# Patient Record
Sex: FEMALE | Race: BLACK OR AFRICAN AMERICAN | NOT HISPANIC OR LATINO | URBAN - NONMETROPOLITAN AREA
[De-identification: names, ages, dates, MRNs, and addresses within clinical notes are randomized per-mention and may not be internally consistent; named-entity substitution may affect disease eponyms.]

---

## 2019-07-09 ENCOUNTER — IMPORTED ENCOUNTER (OUTPATIENT)
Dept: URBAN - NONMETROPOLITAN AREA CLINIC 1 | Facility: CLINIC | Age: 24
End: 2019-07-09

## 2019-07-09 PROCEDURE — V2520 CONTACT LENS HYDROPHILIC: HCPCS

## 2019-07-09 PROCEDURE — 92014 COMPRE OPH EXAM EST PT 1/>: CPT

## 2019-07-09 PROCEDURE — 92015 DETERMINE REFRACTIVE STATE: CPT

## 2019-07-09 PROCEDURE — 92310 CONTACT LENS FITTING OU: CPT

## 2019-07-09 NOTE — PATIENT DISCUSSION
*Myopia & Astigmatism:. -  Discussed refractive status with patient in detail. *Gls RX:. -  A new spectacle prescription was created and dispensed today. *Cl fit:.-  Contact lenses fit well appropriately moving and re-centering with each blink. *Contact lens habits:. -  Healthy contact lens wearing habits were discussed including nightly removal and cleaning of contacts and adherence to the recommended replacement schedule for disposable lenses. -  Symptoms of infection were reviewed and instructions were given to discontinue contact lens use and call for an urgent appointment in the event of an eye infection.

## 2020-07-10 ENCOUNTER — IMPORTED ENCOUNTER (OUTPATIENT)
Dept: URBAN - NONMETROPOLITAN AREA CLINIC 1 | Facility: CLINIC | Age: 25
End: 2020-07-10

## 2020-07-10 PROCEDURE — 92310 CONTACT LENS FITTING OU: CPT

## 2020-07-10 PROCEDURE — 92014 COMPRE OPH EXAM EST PT 1/>: CPT

## 2020-07-10 PROCEDURE — 92015 DETERMINE REFRACTIVE STATE: CPT

## 2020-07-10 NOTE — PATIENT DISCUSSION
Myopia-Discussed diagnosis with patient. -Explained that people who are myopic are at a higher risk for developing RD/RT and reviewed associated S&S.-Pt to contact our office if symptoms develop. Astigmatism-Discussed diagnosis with patient. Updated spec Rx given. Recommend lens that will provide comfort as well as protect safety and health of eyes. CL wear-CLs fit and center well.-Stressed that patient should not sleep in CL. -Updated CL Rx given. -CL care and precautions given.

## 2021-05-12 NOTE — PROCEDURE NOTE: CLINICAL
PROCEDURE NOTE: Dilation of Lacrimal Punctum, With or Without Irrigation #1 Right Lower Lid. Prior to treatment, the risks/benefits/alternatives were discussed. The patient wished to proceed with procedure. The punctum was dilated with a punctum dilator. A 1cc syringe and cannula were used to irrigate saline in to the canaliculus. Patient tolerated procedure well. There were no complications. Post op instructions given. Vaughn Navarro

## 2021-05-12 NOTE — PATIENT DISCUSSION
The risks, benefits, and alternatives to surgery were discussed. The patient elects to proceed with dilation and irrigation.

## 2021-09-21 ENCOUNTER — IMPORTED ENCOUNTER (OUTPATIENT)
Dept: URBAN - NONMETROPOLITAN AREA CLINIC 1 | Facility: CLINIC | Age: 26
End: 2021-09-21

## 2021-09-21 PROCEDURE — 92310 CONTACT LENS FITTING OU: CPT

## 2021-09-21 PROCEDURE — 92015 DETERMINE REFRACTIVE STATE: CPT

## 2021-09-21 PROCEDURE — 92014 COMPRE OPH EXAM EST PT 1/>: CPT

## 2022-04-10 ASSESSMENT — VISUAL ACUITY
OS_SC: 20/20
OD_SC: 20/20-1
OD_SC: J1
OU_CC: J1+
OS_SC: J1
OD_CC: J1
OD_SC: 20/20-1
OD_SC: 20/20
OS_SC: J1
OS_SC: 20/20
OD_SC: 20/20
OD_SC: 20/20-1
OD_SC: J1
OS_SC: 20/20
OD_SC: J1
OS_SC: J1
OS_CC: J1

## 2022-04-10 ASSESSMENT — TONOMETRY
OS_IOP_MMHG: 18
OD_IOP_MMHG: 19
OD_IOP_MMHG: 19
OS_IOP_MMHG: 19
OD_IOP_MMHG: 18
OS_IOP_MMHG: 20

## 2023-09-27 ENCOUNTER — COMPREHENSIVE EXAM (OUTPATIENT)
Dept: RURAL CLINIC 1 | Facility: CLINIC | Age: 28
End: 2023-09-27

## 2023-09-27 DIAGNOSIS — H52.13: ICD-10-CM

## 2023-09-27 DIAGNOSIS — H52.223: ICD-10-CM

## 2023-09-27 PROCEDURE — 92014 COMPRE OPH EXAM EST PT 1/>: CPT

## 2023-09-27 PROCEDURE — 92310-E CONTACT LENS FITTING ESTABLISH PATIENT

## 2023-09-27 PROCEDURE — 92015 DETERMINE REFRACTIVE STATE: CPT

## 2023-09-27 ASSESSMENT — VISUAL ACUITY
OS_CC: 20/20
OD_CC: 20/20
OU_CC: 20/20

## 2023-09-27 ASSESSMENT — TONOMETRY
OD_IOP_MMHG: 18
OS_IOP_MMHG: 18

## 2025-02-26 ENCOUNTER — COMPREHENSIVE EXAM (OUTPATIENT)
Age: 30
End: 2025-02-26

## 2025-02-26 DIAGNOSIS — H52.223: ICD-10-CM

## 2025-02-26 DIAGNOSIS — H52.13: ICD-10-CM

## 2025-02-26 PROCEDURE — 92014 COMPRE OPH EXAM EST PT 1/>: CPT

## 2025-02-26 PROCEDURE — 92015 DETERMINE REFRACTIVE STATE: CPT

## 2025-02-26 PROCEDURE — 92310-1 LEVEL 1 SOFT LENS UPDATE
